# Patient Record
Sex: FEMALE | URBAN - METROPOLITAN AREA
[De-identification: names, ages, dates, MRNs, and addresses within clinical notes are randomized per-mention and may not be internally consistent; named-entity substitution may affect disease eponyms.]

---

## 2023-07-03 ENCOUNTER — NURSE TRIAGE (OUTPATIENT)
Dept: ADMINISTRATIVE | Facility: CLINIC | Age: 23
End: 2023-07-03

## 2023-07-04 NOTE — TELEPHONE ENCOUNTER
Lt  with call back #s.    Reason for Disposition   Message left on identified voice mail    Protocols used: No Contact or Duplicate Contact Call-A-AH